# Patient Record
Sex: MALE | Race: WHITE | NOT HISPANIC OR LATINO | Employment: UNEMPLOYED | URBAN - METROPOLITAN AREA
[De-identification: names, ages, dates, MRNs, and addresses within clinical notes are randomized per-mention and may not be internally consistent; named-entity substitution may affect disease eponyms.]

---
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
## 2021-02-19 ENCOUNTER — HOSPITAL ENCOUNTER (EMERGENCY)
Facility: HOSPITAL | Age: 21
Discharge: HOME | End: 2021-02-19
Attending: EMERGENCY MEDICINE | Admitting: EMERGENCY MEDICINE

## 2021-02-19 ENCOUNTER — APPOINTMENT (OUTPATIENT)
Dept: GENERAL RADIOLOGY | Facility: HOSPITAL | Age: 21
End: 2021-02-19

## 2021-02-19 VITALS
RESPIRATION RATE: 16 BRPM | HEIGHT: 72 IN | SYSTOLIC BLOOD PRESSURE: 138 MMHG | HEART RATE: 111 BPM | WEIGHT: 300 LBS | OXYGEN SATURATION: 98 % | TEMPERATURE: 98.6 F | BODY MASS INDEX: 40.63 KG/M2 | DIASTOLIC BLOOD PRESSURE: 81 MMHG

## 2021-02-19 DIAGNOSIS — S63.502A WRIST SPRAIN, LEFT, INITIAL ENCOUNTER: Primary | ICD-10-CM

## 2021-02-19 PROCEDURE — 73110 X-RAY EXAM OF WRIST: CPT

## 2021-02-19 PROCEDURE — 99283 EMERGENCY DEPT VISIT LOW MDM: CPT

## 2021-02-20 NOTE — ED PROVIDER NOTES
EMERGENCY DEPARTMENT ENCOUNTER      Room Number: 07/07    History is provided by the patient, no translation services needed    HPI:    Chief complaint: Wrist pain    Location: Left wrist    Quality/Severity: Aching/5 out of 10    Timing/Duration: 3 days ago/constant    Modifying Factors: Worse with movement    Associated Symptoms: No swelling or bruising    Narrative: Pt is a 20 y.o. adult who presents complaining of left wrist pain x3 days.  Patient states that he was on ice and slipped and fell.  Patient states that his pain is worse with movement.  Patient denies any swelling.  Patient denies any other injuries.      PMD: Delmar Martell MD    REVIEW OF SYSTEMS  Review of Systems   Constitutional: Negative for chills and fever.   Musculoskeletal: Positive for arthralgias. Negative for gait problem and joint swelling.   Skin: Negative for color change and wound.         PAST MEDICAL HISTORY  Active Ambulatory Problems     Diagnosis Date Noted   • Hormonal imbalance in transgender patient 12/18/2019   • High risk medication use 12/18/2019     Resolved Ambulatory Problems     Diagnosis Date Noted   • No Resolved Ambulatory Problems     Past Medical History:   Diagnosis Date   • Anxiety    • Asthma    • Back pain    • Depression    • Gender dysphoria    • Lordosis        PAST SURGICAL HISTORY  Past Surgical History:   Procedure Laterality Date   • WISDOM TOOTH EXTRACTION Right     Top       FAMILY HISTORY  Family History   Problem Relation Age of Onset   • Breast cancer Maternal Grandmother    • Diabetes type II Maternal Grandmother    • Ulcerative colitis Maternal Grandmother    • Crohn's disease Maternal Grandfather    • Arthritis Paternal Grandmother        SOCIAL HISTORY  Social History     Socioeconomic History   • Marital status:      Spouse name: Not on file   • Number of children: Not on file   • Years of education: Not on file   • Highest education level: Not on file   Tobacco Use   •  "Smoking status: Former Smoker     Packs/day: 0.50     Types: Cigarettes     Quit date: 2019     Years since quittin.3   • Smokeless tobacco: Never Used   Substance and Sexual Activity   • Alcohol use: Not Currently     Frequency: Never   • Drug use: No   • Sexual activity: Yes     Partners: Female       ALLERGIES  Advair diskus [fluticasone-salmeterol]    No current facility-administered medications for this encounter.     Current Outpatient Medications:   •  estradiol valerate (DELESTROGEN) 20 MG/ML injection, Inject 6 mg (0.3 ml) intramuscularly every 3.5 days., Disp: 5 mL, Rfl: 3  •  ibuprofen (ADVIL,MOTRIN) 600 MG tablet, Take 1 tablet by mouth Every 8 (Eight) Hours As Needed for Mild Pain ., Disp: 20 tablet, Rfl: 0  •  methocarbamol (ROBAXIN) 750 MG tablet, Take 1 tablet by mouth 3 (Three) Times a Day As Needed for Muscle Spasms., Disp: 21 tablet, Rfl: 0  •  Needle, Disp, (BD HYPODERMIC NEEDLE) 18G X 1\" misc, Use as directed to draw up estradiol twice weekly., Disp: 100 each, Rfl: 1  •  Needle, Disp, (HYPODERMIC NEEDLE) 25G X 1-1/2\" misc, Use as directed to inject estradiol twice weekly., Disp: 100 each, Rfl: 1  •  promethazine (PHENERGAN) 25 MG tablet, Take 1 tablet by mouth Every 6 (Six) Hours As Needed for Nausea or Vomiting for up to 12 doses., Disp: 12 tablet, Rfl: 0  •  spironolactone (ALDACTONE) 100 MG tablet, Take 100 mg by mouth Daily., Disp: , Rfl:   •  Syringe, Disposable, (EASY GLIDE LUER LOCK SYRINGE) 1 ML misc, Use as directed to draw up and inject estradiol twice weekly., Disp: 100 each, Rfl: 1    PHYSICAL EXAM  ED Triage Vitals [21 1847]   Temp Heart Rate Resp BP SpO2   98.6 °F (37 °C) 111 16 138/81 98 %      Temp src Heart Rate Source Patient Position BP Location FiO2 (%)   Oral Monitor Sitting Left arm --       Physical Exam   Constitutional: He is oriented to person, place, and time and well-developed, well-nourished, and in no distress.   HENT:   Head: Normocephalic and " atraumatic.   Eyes: Conjunctivae and EOM are normal.   Neck: Normal range of motion. Neck supple.   Cardiovascular: Regular rhythm and normal heart sounds. Tachycardia present.   Pulmonary/Chest: Effort normal and breath sounds normal. No respiratory distress.   Abdominal: Soft. Bowel sounds are normal. He exhibits no distension. There is no abdominal tenderness.   Musculoskeletal: Normal range of motion.         General: No edema.      Left wrist: He exhibits tenderness and bony tenderness. He exhibits normal range of motion, no swelling, no effusion, no crepitus, no deformity and no laceration.   Neurological: He is alert and oriented to person, place, and time. GCS score is 15.   Skin: Skin is warm and dry.   Psychiatric: Mood and affect normal.   Nursing note and vitals reviewed.        LAB RESULTS  Lab Results (last 24 hours)     ** No results found for the last 24 hours. **                RADIOLOGY  Xr Wrist 3+ View Left    Result Date: 2/19/2021  CR Wrist Min 3 Vws LT INDICATION: Fall, pain to left thumb COMPARISON: None available. FINDINGS: 3 views of the left wrist.  No fracture or dislocation. No bone erosion or destruction.  No foreign body.     No acute fracture or subluxation. No acute findings. Signer Name: Magali Jeffers MD  Signed: 2/19/2021 7:20 PM  Workstation Name: [id number]  [facility]      I ordered the above radiologic testing and reviewed the results    PROCEDURES  Procedures      PROGRESS AND CONSULTS  ED Course as of Feb 19 1949 Fri Feb 19, 2021 1947 20-year-old male presents to the ED with complaints of pain in his left wrist x3 days.  Patient states that he fell on ice.  After history and physical exam, patient is noted to have full range of motion of his left wrist with tenderness.  Patient is also noted to have tenderness with palpation.  No edema is noted.  X-rays were ordered to rule out fracture or dislocation.  X-ray showed no acute disease process.  I  discussed the findings with patient.  Patient was placed in his wrist splint.  Instructed patient to take Motrin or Tylenol as needed for pain.  Also instructed patient that he will need to follow-up with his primary care provider if his pain did not get better.  Patient expressed verbal understanding of plan of care.    [GT]      ED Course User Index  [GT] Jim Bryant PA-C           MEDICAL DECISION MAKING    MDM       DIAGNOSIS  Final diagnoses:   Wrist sprain, left, initial encounter       Latest Documented Vital Signs:  As of 19:49 EST  BP- 138/81 HR- 111 Temp- 98.6 °F (37 °C) (Oral) O2 sat- 98%    DISPOSITION  Discharged home     Smoking cessation discussed with patient.    Discussed pertinent labs and imaging findings with the patient/family.  Patient/Family voiced understanding of need to follow-up for recheck, further testing as needed.  Return to the emergency Department warnings were given.         Medication List      No changes were made to your prescriptions during this visit.             Follow-up Information     Call  Delmar Martell MD.    Specialty: Family Medicine  Why: As needed  Contact information:  [facility] [id number] [phone]                     Dictated utilizing Dragon dictation     Jim Bryant PA-C  02/19/21 0006